# Patient Record
Sex: FEMALE | Race: WHITE | NOT HISPANIC OR LATINO | Employment: FULL TIME | URBAN - METROPOLITAN AREA
[De-identification: names, ages, dates, MRNs, and addresses within clinical notes are randomized per-mention and may not be internally consistent; named-entity substitution may affect disease eponyms.]

---

## 2020-01-20 ENCOUNTER — OFFICE VISIT (OUTPATIENT)
Dept: CARDIOLOGY | Facility: CLINIC | Age: 35
End: 2020-01-20
Payer: COMMERCIAL

## 2020-01-20 VITALS
SYSTOLIC BLOOD PRESSURE: 114 MMHG | DIASTOLIC BLOOD PRESSURE: 75 MMHG | BODY MASS INDEX: 22.5 KG/M2 | HEART RATE: 75 BPM | HEIGHT: 66 IN | WEIGHT: 140 LBS

## 2020-01-20 DIAGNOSIS — Q23.1 BICUSPID AORTIC VALVE: Primary | ICD-10-CM

## 2020-01-20 PROCEDURE — 99999 PR PBB SHADOW E&M-NEW PATIENT-LVL III: CPT | Mod: PBBFAC,,, | Performed by: STUDENT IN AN ORGANIZED HEALTH CARE EDUCATION/TRAINING PROGRAM

## 2020-01-20 PROCEDURE — 99999 PR PBB SHADOW E&M-NEW PATIENT-LVL III: ICD-10-PCS | Mod: PBBFAC,,, | Performed by: STUDENT IN AN ORGANIZED HEALTH CARE EDUCATION/TRAINING PROGRAM

## 2020-01-20 PROCEDURE — 99203 PR OFFICE/OUTPT VISIT, NEW, LEVL III, 30-44 MIN: ICD-10-PCS | Mod: S$GLB,,, | Performed by: STUDENT IN AN ORGANIZED HEALTH CARE EDUCATION/TRAINING PROGRAM

## 2020-01-20 PROCEDURE — 99203 OFFICE O/P NEW LOW 30 MIN: CPT | Mod: S$GLB,,, | Performed by: STUDENT IN AN ORGANIZED HEALTH CARE EDUCATION/TRAINING PROGRAM

## 2020-01-20 RX ORDER — DROSPIRENONE AND ETHINYL ESTRADIOL 0.03MG-3MG
KIT ORAL
COMMUNITY
Start: 2019-12-28

## 2020-01-20 RX ORDER — CYCLOBENZAPRINE HCL 10 MG
TABLET ORAL
COMMUNITY

## 2020-01-20 RX ORDER — MAGNESIUM 200 MG
TABLET ORAL
COMMUNITY

## 2020-01-20 RX ORDER — UBIQUINOL 100 MG
CAPSULE ORAL
COMMUNITY

## 2020-01-20 RX ORDER — ACETAMINOPHEN 500 MG
5000 TABLET ORAL DAILY
COMMUNITY

## 2020-01-20 RX ORDER — ALPRAZOLAM 0.5 MG/1
TABLET ORAL
COMMUNITY

## 2020-01-20 NOTE — PROGRESS NOTES
"    Cardiology Clinic Note  Reason for Visit: Bicuspid aortic valve     HPI:   35 y/o F here for second opinion regarding bicuspid aortic valve with reported aortic root dilation.  She has recent limitations in her exercise capacity, going up 12 steps, and during her workouts. These symptoms have occurred over the past 6-12  Months. No known hx of bicuspid aortic valve. She has no other past medical history.    She had an evaluation at [facility] 6 months ago and reports moderate AS and moderate AI, aortic root at 4.1cm.      ROS:    Review of Systems   All other systems reviewed and are negative.    PMH:   History reviewed. No pertinent past medical history.  History reviewed. No pertinent surgical history.  Allergies:   Review of patient's allergies indicates:  No Known Allergies  Medications:     Current Outpatient Medications on File Prior to Visit   Medication Sig Dispense Refill    ALPRAZolam (XANAX) 0.5 MG tablet Xanax 0.5 mg tablet   Take 1/2 - 1 tab PO SL Q8H PRN anxiety      cholecalciferol, vitamin D3, (VITAMIN D3) 125 mcg (5,000 unit) Tab Take 5,000 Units by mouth once daily.      coQ10, ubiquinol, 100 mg Cap 1 capsule with a meal      cyclobenzaprine (FLEXERIL) 10 MG tablet 1 tablet as needed      iron polysac-iron heme polypep 28 mg Tab once daily.       magnesium 200 mg Tab 2 tablets with a meal      TERE 3-0.03 mg per tablet TK 1 T PO D AS DIRECTED      vitamin E 200 UNIT capsule Take 200 Units by mouth once daily.       No current facility-administered medications on file prior to visit.      Social History:     Social History     Tobacco Use    Smoking status: Former Smoker    Smokeless tobacco: Never Used   Substance Use Topics    Alcohol use: Yes     Comment: socially     Family History:   History reviewed. No pertinent family history.  Physical Exam:   /75 (BP Location: Left arm, Patient Position: Sitting, BP Method: Large (Automatic))   Pulse 75   Ht 5' 6" (1.676 m)   Wt 63.5 kg " (139 lb 15.9 oz)   BMI 22.60 kg/m²      Physical Exam   Constitutional: She is oriented to person, place, and time. She appears well-developed and well-nourished. No distress.   HENT:   Head: Normocephalic and atraumatic.   Eyes: No scleral icterus.   Cardiovascular: Normal rate and regular rhythm.   Murmur heard.  Systolic murmur at 2nd right intercostal space and radiation to the carotids    Pulmonary/Chest: Effort normal and breath sounds normal.   Musculoskeletal: She exhibits no edema.   Neurological: She is alert and oriented to person, place, and time.   Skin: Skin is warm. She is not diaphoretic.   Psychiatric: She has a normal mood and affect.       Labs:     No results found for: NA, K, CL, CO2, BUN, CREATININE, GLUCOSE, ANIONGAP  No results found for: HGBA1C  No results found for: BNP, BNPTRIAGEBLO No results found for: WBC, HGB, HCT, PLT, GRAN  No results found for: CHOL, HDL, LDLCALC, TRIG       Imaging:       No results found for: EF    EKG: pending   Assessment:      1. Bicuspid aortic valve      She is here for evaluation for bicuspid aortic valve, symptomatic with exertional dyspnea. Will start echo and CTA chest abdomen and pelvis and thereafter refer to CTS.     Plan:   Jamal was seen today for establish care.    Diagnoses and all orders for this visit:    Bicuspid aortic valve    CTA chest abdomen and pelvis   Echo   I have given the do's and dont's of aortic aneurysm regarding what exercise she can and cant do    Will refer to CTS after the above studies are resulted       Signed:  Graham Batista DO  Cardiology Fellow